# Patient Record
Sex: FEMALE | Race: WHITE | NOT HISPANIC OR LATINO | Employment: FULL TIME | URBAN - METROPOLITAN AREA
[De-identification: names, ages, dates, MRNs, and addresses within clinical notes are randomized per-mention and may not be internally consistent; named-entity substitution may affect disease eponyms.]

---

## 2023-02-25 PROBLEM — R01.1 HEART MURMUR: Status: ACTIVE | Noted: 2023-02-25

## 2023-02-25 PROBLEM — E66.01 MORBID OBESITY (MULTI): Status: ACTIVE | Noted: 2023-02-25

## 2023-02-25 PROBLEM — R63.5 WEIGHT GAIN: Status: ACTIVE | Noted: 2023-02-25

## 2023-02-25 PROBLEM — R51.9 CHRONIC HEADACHES: Status: ACTIVE | Noted: 2023-02-25

## 2023-02-25 PROBLEM — I10 HTN (HYPERTENSION): Status: ACTIVE | Noted: 2023-02-25

## 2023-02-25 PROBLEM — E05.00 GRAVES DISEASE: Status: ACTIVE | Noted: 2023-02-25

## 2023-02-25 PROBLEM — G43.909 MIGRAINES: Status: ACTIVE | Noted: 2023-02-25

## 2023-02-25 PROBLEM — L73.2 HIDRADENITIS SUPPURATIVA: Status: ACTIVE | Noted: 2023-02-25

## 2023-02-25 PROBLEM — E05.90 HYPERTHYROIDISM: Status: ACTIVE | Noted: 2023-02-25

## 2023-02-25 PROBLEM — G89.29 CHRONIC HEADACHES: Status: ACTIVE | Noted: 2023-02-25

## 2023-02-25 PROBLEM — S83.249A MEDIAL MENISCUS TEAR: Status: ACTIVE | Noted: 2023-02-25

## 2023-02-25 PROBLEM — M17.12 ARTHRITIS OF LEFT KNEE: Status: ACTIVE | Noted: 2023-02-25

## 2023-02-25 PROBLEM — E66.9 OBESITY: Status: ACTIVE | Noted: 2023-02-25

## 2023-02-25 PROBLEM — M25.562 LEFT KNEE PAIN: Status: ACTIVE | Noted: 2023-02-25

## 2023-02-25 PROBLEM — M22.42 CHONDROMALACIA OF LEFT PATELLA: Status: ACTIVE | Noted: 2023-02-25

## 2023-02-25 PROBLEM — M22.2X2 PATELLOFEMORAL PAIN SYNDROME OF LEFT KNEE: Status: ACTIVE | Noted: 2023-02-25

## 2023-02-25 PROBLEM — D53.9 ANEMIA, DEFICIENCY: Status: ACTIVE | Noted: 2023-02-25

## 2023-02-25 PROBLEM — M17.9 OSTEOARTHRITIS OF KNEE, UNSPECIFIED: Status: ACTIVE | Noted: 2023-02-25

## 2023-02-25 RX ORDER — LISINOPRIL AND HYDROCHLOROTHIAZIDE 10; 12.5 MG/1; MG/1
1 TABLET ORAL DAILY
COMMUNITY
Start: 2022-04-08 | End: 2024-04-16

## 2023-02-25 RX ORDER — VIT C/E/ZN/COPPR/LUTEIN/ZEAXAN 250MG-90MG
1 CAPSULE ORAL DAILY
COMMUNITY
Start: 2019-10-24

## 2023-02-25 RX ORDER — GALCANEZUMAB 120 MG/ML
INJECTION, SOLUTION SUBCUTANEOUS
COMMUNITY
Start: 2022-03-16 | End: 2023-03-14

## 2023-02-25 RX ORDER — MULTIVIT WITH MINERALS/HERBS
1 TABLET ORAL DAILY
COMMUNITY
Start: 2019-10-24

## 2023-02-25 RX ORDER — METOPROLOL SUCCINATE 50 MG/1
1 TABLET, EXTENDED RELEASE ORAL DAILY
COMMUNITY
Start: 2019-10-24 | End: 2023-04-25

## 2023-02-25 RX ORDER — TOPIRAMATE 25 MG/1
1-2 TABLET ORAL NIGHTLY
COMMUNITY
Start: 2014-12-30 | End: 2023-03-14 | Stop reason: SDUPTHER

## 2023-02-25 RX ORDER — AMLODIPINE BESYLATE 5 MG/1
1 TABLET ORAL DAILY
COMMUNITY
Start: 2015-01-10 | End: 2023-04-25

## 2023-03-06 ENCOUNTER — APPOINTMENT (OUTPATIENT)
Dept: PRIMARY CARE | Facility: CLINIC | Age: 43
End: 2023-03-06
Payer: COMMERCIAL

## 2023-03-14 ENCOUNTER — OFFICE VISIT (OUTPATIENT)
Dept: PRIMARY CARE | Facility: CLINIC | Age: 43
End: 2023-03-14
Payer: COMMERCIAL

## 2023-03-14 VITALS
BODY MASS INDEX: 54.18 KG/M2 | DIASTOLIC BLOOD PRESSURE: 76 MMHG | WEIGHT: 293 LBS | TEMPERATURE: 98 F | SYSTOLIC BLOOD PRESSURE: 126 MMHG | HEART RATE: 94 BPM | OXYGEN SATURATION: 98 %

## 2023-03-14 DIAGNOSIS — E66.01 MORBID OBESITY (MULTI): Primary | ICD-10-CM

## 2023-03-14 DIAGNOSIS — G43.009 MIGRAINE WITHOUT AURA AND WITHOUT STATUS MIGRAINOSUS, NOT INTRACTABLE: ICD-10-CM

## 2023-03-14 PROCEDURE — 1036F TOBACCO NON-USER: CPT | Performed by: FAMILY MEDICINE

## 2023-03-14 PROCEDURE — 3074F SYST BP LT 130 MM HG: CPT | Performed by: FAMILY MEDICINE

## 2023-03-14 PROCEDURE — 3078F DIAST BP <80 MM HG: CPT | Performed by: FAMILY MEDICINE

## 2023-03-14 PROCEDURE — 99214 OFFICE O/P EST MOD 30 MIN: CPT | Performed by: FAMILY MEDICINE

## 2023-03-14 RX ORDER — NABUMETONE 750 MG/1
750-1500 TABLET, FILM COATED ORAL DAILY
COMMUNITY
Start: 2022-10-19 | End: 2023-03-14 | Stop reason: SDUPTHER

## 2023-03-14 RX ORDER — TOPIRAMATE 50 MG/1
100 TABLET, FILM COATED ORAL NIGHTLY
Qty: 60 TABLET | Refills: 11 | Status: SHIPPED | OUTPATIENT
Start: 2023-03-14 | End: 2024-03-13 | Stop reason: WASHOUT

## 2023-03-14 RX ORDER — NABUMETONE 750 MG/1
750-1500 TABLET, FILM COATED ORAL DAILY
Qty: 180 TABLET | Refills: 3 | Status: SHIPPED | OUTPATIENT
Start: 2023-03-14 | End: 2024-03-13 | Stop reason: WASHOUT

## 2023-03-14 NOTE — PROGRESS NOTES
Subjective   Patient ID: Yudy Chou is a 42 y.o. female who presents for Follow-up (Review medication and FMLA forms. ) and Thyroid Problem (Weight weight; thyroid concerns ).    HPI patient here with complaint of weight gain.  She states she is eating much healthier and yet she still gains weight.  She admits she has a very inactive job and does work long shifts of up to 12 to 14 hours.  She is too tired to exercise.  Patient has been using her Nurtec for the headaches.  This works well.  Unfortunately she is still having a fair amount of headaches regularly.  Patient does admit her sleep is poor.  She denies any signs of apnea.  Her partner has not reported any concerns.  Review of Systems    Objective   /76 (BP Location: Left arm, Patient Position: Sitting, BP Cuff Size: Adult)   Pulse 94   Temp 36.7 °C (98 °F) (Temporal)   Wt (!) 148 kg (325 lb 9.6 oz)   LMP 02/20/2023   SpO2 98%   BMI 54.18 kg/m²     Physical Exam  Alert, pleasant and in no acute distress.  Heart: Regular rate and rhythm without murmur  Lungs: Clear to auscultation  Assessment/Plan   Problem List Items Addressed This Visit          Endocrine/Metabolic    Morbid obesity (CMS/HCC) - Primary    Relevant Orders    CBC    TSH    T4, free       Other    Migraines    Relevant Medications    topiramate (Topamax) 50 mg tablet    nabumetone (Relafen) 750 mg tablet   Patient here with complaint of weight gain.  Long talk with patient regarding diet.  She is generally eating a healthy diet.  Advised patient to work on a lower calorie diet.  Advised her to think about someone who has  gastric bypass surgery.  We will have her go on a very low diet and slowly taper up to 1200 mg and stay there.  We will have her follow-up in 2 to 3 months.  I did recheck her CBC and her thyroid due to her ongoing struggles with weight and fatigue.  For her headaches, we will increase her topiramate.  She is to continue using the Nurtec as needed as this has  worked very well.  We will call in 3 to 5 days with lab results.

## 2023-04-21 DIAGNOSIS — I10 ESSENTIAL (PRIMARY) HYPERTENSION: ICD-10-CM

## 2023-04-25 RX ORDER — AMLODIPINE BESYLATE 5 MG/1
TABLET ORAL
Qty: 90 TABLET | Refills: 3 | Status: SHIPPED | OUTPATIENT
Start: 2023-04-25 | End: 2024-04-16

## 2023-04-25 RX ORDER — METOPROLOL SUCCINATE 50 MG/1
TABLET, EXTENDED RELEASE ORAL
Qty: 90 TABLET | Refills: 3 | Status: SHIPPED | OUTPATIENT
Start: 2023-04-25

## 2023-06-23 DIAGNOSIS — G43.009 MIGRAINE WITHOUT AURA AND WITHOUT STATUS MIGRAINOSUS, NOT INTRACTABLE: Primary | ICD-10-CM

## 2023-07-18 ENCOUNTER — TELEPHONE (OUTPATIENT)
Dept: PRIMARY CARE | Facility: CLINIC | Age: 43
End: 2023-07-18
Payer: COMMERCIAL

## 2023-07-18 DIAGNOSIS — T75.3XXA MOTION SICKNESS, INITIAL ENCOUNTER: Primary | ICD-10-CM

## 2023-07-18 RX ORDER — SCOLOPAMINE TRANSDERMAL SYSTEM 1 MG/1
1 PATCH, EXTENDED RELEASE TRANSDERMAL
Qty: 4 PATCH | Refills: 2 | Status: SHIPPED | OUTPATIENT
Start: 2023-07-18 | End: 2023-09-16

## 2023-07-18 NOTE — TELEPHONE ENCOUNTER
Pt called and states that she leaving for a cruise on Thursday and is wondering if you could prescribe her something for sea sickness. Pharmacy is on file and no allergies to any medications.

## 2023-10-11 ENCOUNTER — TELEPHONE (OUTPATIENT)
Dept: PRIMARY CARE | Facility: CLINIC | Age: 43
End: 2023-10-11
Payer: COMMERCIAL

## 2023-10-11 NOTE — TELEPHONE ENCOUNTER
Pt called and scheduled annual for 11/13 @ 3:30. Pt would like to know if you can place the blood work orders in prior to appt. Ty

## 2023-10-13 DIAGNOSIS — E05.90 HYPERTHYROIDISM: ICD-10-CM

## 2023-10-13 DIAGNOSIS — I10 PRIMARY HYPERTENSION: Primary | ICD-10-CM

## 2023-10-13 DIAGNOSIS — Z00.00 ANNUAL PHYSICAL EXAM: ICD-10-CM

## 2023-10-13 DIAGNOSIS — E66.01 CLASS 3 SEVERE OBESITY WITH SERIOUS COMORBIDITY AND BODY MASS INDEX (BMI) OF 40.0 TO 44.9 IN ADULT, UNSPECIFIED OBESITY TYPE (MULTI): ICD-10-CM

## 2023-10-24 ENCOUNTER — APPOINTMENT (OUTPATIENT)
Dept: PRIMARY CARE | Facility: CLINIC | Age: 43
End: 2023-10-24
Payer: COMMERCIAL

## 2023-11-11 ENCOUNTER — LAB (OUTPATIENT)
Dept: LAB | Facility: LAB | Age: 43
End: 2023-11-11
Payer: COMMERCIAL

## 2023-11-11 DIAGNOSIS — Z00.00 ANNUAL PHYSICAL EXAM: ICD-10-CM

## 2023-11-11 DIAGNOSIS — I10 PRIMARY HYPERTENSION: ICD-10-CM

## 2023-11-11 DIAGNOSIS — E66.01 CLASS 3 SEVERE OBESITY WITH SERIOUS COMORBIDITY AND BODY MASS INDEX (BMI) OF 40.0 TO 44.9 IN ADULT, UNSPECIFIED OBESITY TYPE (MULTI): ICD-10-CM

## 2023-11-11 DIAGNOSIS — E05.90 HYPERTHYROIDISM: ICD-10-CM

## 2023-11-11 LAB
ALBUMIN SERPL BCP-MCNC: 4.1 G/DL (ref 3.4–5)
ALP SERPL-CCNC: 68 U/L (ref 33–110)
ALT SERPL W P-5'-P-CCNC: 27 U/L (ref 7–45)
ANION GAP SERPL CALC-SCNC: 11 MMOL/L (ref 10–20)
AST SERPL W P-5'-P-CCNC: 16 U/L (ref 9–39)
BILIRUB SERPL-MCNC: 0.3 MG/DL (ref 0–1.2)
BUN SERPL-MCNC: 16 MG/DL (ref 6–23)
CALCIUM SERPL-MCNC: 8.7 MG/DL (ref 8.6–10.3)
CHLORIDE SERPL-SCNC: 104 MMOL/L (ref 98–107)
CHOLEST SERPL-MCNC: 190 MG/DL (ref 0–199)
CHOLESTEROL/HDL RATIO: 3.6
CO2 SERPL-SCNC: 29 MMOL/L (ref 21–32)
CREAT SERPL-MCNC: 0.77 MG/DL (ref 0.5–1.05)
ERYTHROCYTE [DISTWIDTH] IN BLOOD BY AUTOMATED COUNT: 14.1 % (ref 11.5–14.5)
EST. AVERAGE GLUCOSE BLD GHB EST-MCNC: 114 MG/DL
GFR SERPL CREATININE-BSD FRML MDRD: >90 ML/MIN/1.73M*2
GLUCOSE SERPL-MCNC: 93 MG/DL (ref 74–99)
HBA1C MFR BLD: 5.6 %
HCT VFR BLD AUTO: 36.6 % (ref 36–46)
HDLC SERPL-MCNC: 52.9 MG/DL
HGB BLD-MCNC: 11.6 G/DL (ref 12–16)
LDLC SERPL CALC-MCNC: 107 MG/DL
MCH RBC QN AUTO: 28.8 PG (ref 26–34)
MCHC RBC AUTO-ENTMCNC: 31.7 G/DL (ref 32–36)
MCV RBC AUTO: 91 FL (ref 80–100)
NON HDL CHOLESTEROL: 137 MG/DL (ref 0–149)
NRBC BLD-RTO: 0 /100 WBCS (ref 0–0)
PLATELET # BLD AUTO: 348 X10*3/UL (ref 150–450)
POTASSIUM SERPL-SCNC: 4.5 MMOL/L (ref 3.5–5.3)
PROT SERPL-MCNC: 7.3 G/DL (ref 6.4–8.2)
RBC # BLD AUTO: 4.03 X10*6/UL (ref 4–5.2)
SODIUM SERPL-SCNC: 139 MMOL/L (ref 136–145)
T4 FREE SERPL-MCNC: 0.44 NG/DL (ref 0.61–1.12)
TRIGL SERPL-MCNC: 150 MG/DL (ref 0–149)
TSH SERPL-ACNC: 22.5 MIU/L (ref 0.44–3.98)
VLDL: 30 MG/DL (ref 0–40)
WBC # BLD AUTO: 9.4 X10*3/UL (ref 4.4–11.3)

## 2023-11-11 PROCEDURE — 80061 LIPID PANEL: CPT

## 2023-11-11 PROCEDURE — 83036 HEMOGLOBIN GLYCOSYLATED A1C: CPT

## 2023-11-11 PROCEDURE — 80053 COMPREHEN METABOLIC PANEL: CPT

## 2023-11-11 PROCEDURE — 84443 ASSAY THYROID STIM HORMONE: CPT

## 2023-11-11 PROCEDURE — 36415 COLL VENOUS BLD VENIPUNCTURE: CPT

## 2023-11-11 PROCEDURE — 84439 ASSAY OF FREE THYROXINE: CPT

## 2023-11-11 PROCEDURE — 85027 COMPLETE CBC AUTOMATED: CPT

## 2023-11-13 ENCOUNTER — OFFICE VISIT (OUTPATIENT)
Dept: PRIMARY CARE | Facility: CLINIC | Age: 43
End: 2023-11-13
Payer: COMMERCIAL

## 2023-11-13 VITALS — WEIGHT: 293 LBS | DIASTOLIC BLOOD PRESSURE: 74 MMHG | SYSTOLIC BLOOD PRESSURE: 126 MMHG | BODY MASS INDEX: 55.11 KG/M2

## 2023-11-13 DIAGNOSIS — Z12.39 ENCOUNTER FOR SCREENING FOR MALIGNANT NEOPLASM OF BREAST, UNSPECIFIED SCREENING MODALITY: ICD-10-CM

## 2023-11-13 DIAGNOSIS — M79.672 PAIN IN BOTH FEET: ICD-10-CM

## 2023-11-13 DIAGNOSIS — G43.009 MIGRAINE WITHOUT AURA AND WITHOUT STATUS MIGRAINOSUS, NOT INTRACTABLE: ICD-10-CM

## 2023-11-13 DIAGNOSIS — Z00.00 ANNUAL PHYSICAL EXAM: Primary | ICD-10-CM

## 2023-11-13 DIAGNOSIS — M79.671 PAIN IN BOTH FEET: ICD-10-CM

## 2023-11-13 DIAGNOSIS — E03.9 HYPOTHYROIDISM, UNSPECIFIED TYPE: ICD-10-CM

## 2023-11-13 PROCEDURE — 1036F TOBACCO NON-USER: CPT | Performed by: FAMILY MEDICINE

## 2023-11-13 PROCEDURE — 90471 IMMUNIZATION ADMIN: CPT | Performed by: FAMILY MEDICINE

## 2023-11-13 PROCEDURE — 99396 PREV VISIT EST AGE 40-64: CPT | Performed by: FAMILY MEDICINE

## 2023-11-13 PROCEDURE — 3074F SYST BP LT 130 MM HG: CPT | Performed by: FAMILY MEDICINE

## 2023-11-13 PROCEDURE — 90686 IIV4 VACC NO PRSV 0.5 ML IM: CPT | Performed by: FAMILY MEDICINE

## 2023-11-13 PROCEDURE — 3078F DIAST BP <80 MM HG: CPT | Performed by: FAMILY MEDICINE

## 2023-11-13 RX ORDER — LEVOTHYROXINE SODIUM 50 UG/1
50 TABLET ORAL DAILY
Qty: 30 TABLET | Refills: 11 | Status: SHIPPED | OUTPATIENT
Start: 2023-11-13 | End: 2024-03-13 | Stop reason: SDUPTHER

## 2023-11-13 RX ORDER — LEVOTHYROXINE SODIUM 50 UG/1
50 TABLET ORAL DAILY
Qty: 30 TABLET | Refills: 11 | Status: SHIPPED | OUTPATIENT
Start: 2023-11-13 | End: 2023-11-13 | Stop reason: SDUPTHER

## 2023-11-13 ASSESSMENT — PAIN SCALES - GENERAL: PAINLEVEL: 0-NO PAIN

## 2023-11-13 ASSESSMENT — ENCOUNTER SYMPTOMS: DEPRESSION: 0

## 2023-11-13 NOTE — PROGRESS NOTES
Subjective   Patient ID: Yudy Chou is a 43 y.o. female who presents for Annual Exam (Annual physical exam,pt is not fasting. ).    HPI patient here for annual checkup.  Her main complaint is her weight.  She is working long hours, anywhere from 10 to 14 hours a day.  She is not able to be very physically active.  She sits all day at work.    Review of Systems    Objective   /74 (BP Location: Left arm, Patient Position: Sitting, BP Cuff Size: Adult)   Wt (!) 150 kg (331 lb 3.2 oz)   BMI 55.11 kg/m²     Physical Exam  Alert, pleasant and in no acute distress.  Neck: Supple, no JVD or carotid bruit  Heart: Regular rate and rhythm, no murmur  Lungs: Clear to auscultation  Lower extremities: No edema  Abd: nontender,no mass    Assessment/Plan   Problem List Items Addressed This Visit             ICD-10-CM       Neuro    Migraines G43.909    Relevant Medications    rimegepant (Nurtec ODT) 75 mg tablet,disintegrating     Other Visit Diagnoses         Codes    Annual physical exam    -  Primary Z00.00    Hypothyroidism, unspecified type     E03.9    Relevant Medications    levothyroxine (Synthroid, Levoxyl) 50 mcg tablet    Pain in both feet     M79.671, M79.672    Relevant Orders    Referral to Podiatry    Encounter for screening for malignant neoplasm of breast, unspecified screening modality     Z12.39    Relevant Orders    BI mammo bilateral screening tomosynthesis        1.  Annual: Routine labs were completed and results reviewed.  Flu vaccine provided.  Morbid obesity: Discussed various treatment options.  We will see if her insurance will cover Mounjaro.  If not, she will discuss with her insurance what options she has.  Follow-up 3 months for reevaluation and Pap test.  Migraines: Under good control with topiramate and Nurtec.  Nurtec has been the only thing that truly has given her good relief.  Past treatments have not worked well at all.  Patient had side effects from the triptans.  CGRP inhibitor  did not work.  Patient with cracking skin on her feet.  She also has discomfort.  We will have her see podiatry

## 2024-01-17 ENCOUNTER — APPOINTMENT (OUTPATIENT)
Dept: RADIOLOGY | Facility: CLINIC | Age: 44
End: 2024-01-17
Payer: COMMERCIAL

## 2024-01-26 ENCOUNTER — APPOINTMENT (OUTPATIENT)
Dept: RADIOLOGY | Facility: CLINIC | Age: 44
End: 2024-01-26
Payer: COMMERCIAL

## 2024-02-08 ENCOUNTER — APPOINTMENT (OUTPATIENT)
Dept: RADIOLOGY | Facility: CLINIC | Age: 44
End: 2024-02-08
Payer: COMMERCIAL

## 2024-02-23 ENCOUNTER — APPOINTMENT (OUTPATIENT)
Dept: RADIOLOGY | Facility: CLINIC | Age: 44
End: 2024-02-23
Payer: COMMERCIAL

## 2024-02-23 ENCOUNTER — APPOINTMENT (OUTPATIENT)
Dept: PRIMARY CARE | Facility: CLINIC | Age: 44
End: 2024-02-23
Payer: COMMERCIAL

## 2024-03-12 ENCOUNTER — LAB (OUTPATIENT)
Dept: LAB | Facility: LAB | Age: 44
End: 2024-03-12
Payer: COMMERCIAL

## 2024-03-12 DIAGNOSIS — E66.01 MORBID OBESITY (MULTI): ICD-10-CM

## 2024-03-12 LAB
ERYTHROCYTE [DISTWIDTH] IN BLOOD BY AUTOMATED COUNT: 14.3 % (ref 11.5–14.5)
HCT VFR BLD AUTO: 35.7 % (ref 36–46)
HGB BLD-MCNC: 11.7 G/DL (ref 12–16)
MCH RBC QN AUTO: 28.5 PG (ref 26–34)
MCHC RBC AUTO-ENTMCNC: 32.8 G/DL (ref 32–36)
MCV RBC AUTO: 87 FL (ref 80–100)
NRBC BLD-RTO: 0 /100 WBCS (ref 0–0)
PLATELET # BLD AUTO: 408 X10*3/UL (ref 150–450)
RBC # BLD AUTO: 4.1 X10*6/UL (ref 4–5.2)
T4 FREE SERPL-MCNC: 0.67 NG/DL (ref 0.61–1.12)
TSH SERPL-ACNC: 12.82 MIU/L (ref 0.44–3.98)
WBC # BLD AUTO: 11.7 X10*3/UL (ref 4.4–11.3)

## 2024-03-12 PROCEDURE — 84443 ASSAY THYROID STIM HORMONE: CPT

## 2024-03-12 PROCEDURE — 84439 ASSAY OF FREE THYROXINE: CPT

## 2024-03-12 PROCEDURE — 85027 COMPLETE CBC AUTOMATED: CPT

## 2024-03-12 PROCEDURE — 36415 COLL VENOUS BLD VENIPUNCTURE: CPT

## 2024-03-13 ENCOUNTER — APPOINTMENT (OUTPATIENT)
Dept: RADIOLOGY | Facility: CLINIC | Age: 44
End: 2024-03-13
Payer: COMMERCIAL

## 2024-03-13 ENCOUNTER — OFFICE VISIT (OUTPATIENT)
Dept: PRIMARY CARE | Facility: CLINIC | Age: 44
End: 2024-03-13
Payer: COMMERCIAL

## 2024-03-13 VITALS
BODY MASS INDEX: 53.65 KG/M2 | TEMPERATURE: 96.9 F | OXYGEN SATURATION: 97 % | HEART RATE: 102 BPM | WEIGHT: 293 LBS | SYSTOLIC BLOOD PRESSURE: 126 MMHG | DIASTOLIC BLOOD PRESSURE: 70 MMHG

## 2024-03-13 DIAGNOSIS — G43.909 MIGRAINE WITHOUT STATUS MIGRAINOSUS, NOT INTRACTABLE, UNSPECIFIED MIGRAINE TYPE: ICD-10-CM

## 2024-03-13 DIAGNOSIS — E03.9 HYPOTHYROIDISM, UNSPECIFIED TYPE: ICD-10-CM

## 2024-03-13 DIAGNOSIS — R51.9 CHRONIC NONINTRACTABLE HEADACHE, UNSPECIFIED HEADACHE TYPE: ICD-10-CM

## 2024-03-13 DIAGNOSIS — G89.29 CHRONIC NONINTRACTABLE HEADACHE, UNSPECIFIED HEADACHE TYPE: ICD-10-CM

## 2024-03-13 DIAGNOSIS — T75.3XXA MOTION SICKNESS, INITIAL ENCOUNTER: Primary | ICD-10-CM

## 2024-03-13 PROCEDURE — 3078F DIAST BP <80 MM HG: CPT | Performed by: FAMILY MEDICINE

## 2024-03-13 PROCEDURE — 1036F TOBACCO NON-USER: CPT | Performed by: FAMILY MEDICINE

## 2024-03-13 PROCEDURE — 99214 OFFICE O/P EST MOD 30 MIN: CPT | Performed by: FAMILY MEDICINE

## 2024-03-13 PROCEDURE — 3074F SYST BP LT 130 MM HG: CPT | Performed by: FAMILY MEDICINE

## 2024-03-13 RX ORDER — LEVOTHYROXINE SODIUM 75 UG/1
75 TABLET ORAL DAILY
Qty: 30 TABLET | Refills: 11 | Status: SHIPPED | OUTPATIENT
Start: 2024-03-13 | End: 2025-03-13

## 2024-03-13 RX ORDER — SCOLOPAMINE TRANSDERMAL SYSTEM 1 MG/1
1 PATCH, EXTENDED RELEASE TRANSDERMAL
Qty: 4 PATCH | Refills: 0 | Status: SHIPPED | OUTPATIENT
Start: 2024-03-13 | End: 2024-05-12

## 2024-03-13 ASSESSMENT — PAIN SCALES - GENERAL: PAINLEVEL: 0-NO PAIN

## 2024-03-13 ASSESSMENT — ENCOUNTER SYMPTOMS: DEPRESSION: 0

## 2024-03-13 NOTE — PROGRESS NOTES
Subjective   Patient ID: Yudy Chou is a 43 y.o. female who presents for Follow-up (Pt is here for FMLA and thyroid check. ).    HPI   Tired despite thyroid replacement.  This weeks levels show an elevated TSH with a low normal T4.  Migraines doing pretty good.  On night shift.    Review of Systems    Objective   /70 (BP Location: Left arm, Patient Position: Sitting, BP Cuff Size: Adult)   Pulse 102   Temp 36.1 °C (96.9 °F) (Temporal)   Wt 146 kg (322 lb 6.4 oz)   SpO2 97%   BMI 53.65 kg/m²     Physical Exam  Alert, pleasant and in no acute distress.  Heart: Regular rate and rhythm without murmur  Lungs: Clear to auscultation  Lower extremities: No edema    Assessment/Plan   Problem List Items Addressed This Visit             ICD-10-CM    Chronic headaches R51.9, G89.29    Migraines G43.909     Other Visit Diagnoses         Codes    Motion sickness, initial encounter    -  Primary T75.3XXA    Relevant Medications    scopolamine (Transderm-Scop) 1 mg over 3 days patch 3 day    Hypothyroidism, unspecified type     E03.9    Relevant Medications    levothyroxine (Synthroid, Levoxyl) 75 mcg tablet    Other Relevant Orders    TSH with reflex to Free T4 if abnormal        1.  Hypothyroidism: With high TSH and low normal T4, we will increase levothyroxine.  She is to get her next TSH after 2 months.  2.  Migraines/chronic headaches: Doing well on Nurtec  3.  Motion sickness: Patient going on a cruise and would like treatment for motion sickness.  She has done well with Transderm scopolamine patches.  Prescription provided.  Follow-up in 3 to 4 months

## 2024-03-27 ENCOUNTER — APPOINTMENT (OUTPATIENT)
Dept: RADIOLOGY | Facility: CLINIC | Age: 44
End: 2024-03-27
Payer: COMMERCIAL

## 2024-04-05 ENCOUNTER — APPOINTMENT (OUTPATIENT)
Dept: RADIOLOGY | Facility: CLINIC | Age: 44
End: 2024-04-05
Payer: COMMERCIAL

## 2024-04-16 DIAGNOSIS — I10 ESSENTIAL (PRIMARY) HYPERTENSION: ICD-10-CM

## 2024-04-16 RX ORDER — LISINOPRIL AND HYDROCHLOROTHIAZIDE 10; 12.5 MG/1; MG/1
1 TABLET ORAL DAILY
Qty: 90 TABLET | Refills: 3 | Status: SHIPPED | OUTPATIENT
Start: 2024-04-16

## 2024-04-16 RX ORDER — AMLODIPINE BESYLATE 5 MG/1
TABLET ORAL
Qty: 90 TABLET | Refills: 3 | Status: SHIPPED | OUTPATIENT
Start: 2024-04-16

## 2024-04-24 ENCOUNTER — APPOINTMENT (OUTPATIENT)
Dept: RADIOLOGY | Facility: CLINIC | Age: 44
End: 2024-04-24
Payer: COMMERCIAL

## 2024-05-08 ENCOUNTER — APPOINTMENT (OUTPATIENT)
Dept: RADIOLOGY | Facility: CLINIC | Age: 44
End: 2024-05-08
Payer: COMMERCIAL

## 2024-05-14 ENCOUNTER — APPOINTMENT (OUTPATIENT)
Dept: RADIOLOGY | Facility: CLINIC | Age: 44
End: 2024-05-14
Payer: COMMERCIAL

## 2024-05-23 ENCOUNTER — APPOINTMENT (OUTPATIENT)
Dept: RADIOLOGY | Facility: CLINIC | Age: 44
End: 2024-05-23
Payer: COMMERCIAL

## 2024-06-05 ENCOUNTER — APPOINTMENT (OUTPATIENT)
Dept: RADIOLOGY | Facility: CLINIC | Age: 44
End: 2024-06-05
Payer: COMMERCIAL

## 2024-06-20 ENCOUNTER — APPOINTMENT (OUTPATIENT)
Dept: RADIOLOGY | Facility: CLINIC | Age: 44
End: 2024-06-20
Payer: COMMERCIAL

## 2024-06-30 DIAGNOSIS — I10 ESSENTIAL (PRIMARY) HYPERTENSION: ICD-10-CM

## 2024-07-01 PROBLEM — J20.9 ACUTE BRONCHITIS: Status: ACTIVE | Noted: 2024-07-01

## 2024-07-01 PROBLEM — K62.5 RECTAL HEMORRHAGE: Status: ACTIVE | Noted: 2024-07-01

## 2024-07-01 PROBLEM — L72.3 SEBACEOUS CYST: Status: ACTIVE | Noted: 2024-07-01

## 2024-07-01 PROBLEM — M22.2X9 PATELLOFEMORAL PAIN SYNDROME: Status: ACTIVE | Noted: 2024-07-01

## 2024-07-01 PROBLEM — B37.31 CANDIDIASIS OF VAGINA: Status: ACTIVE | Noted: 2024-07-01

## 2024-07-01 PROBLEM — L70.9 ACNE: Status: ACTIVE | Noted: 2024-07-01

## 2024-07-01 PROBLEM — K29.00 ACUTE GASTRITIS: Status: ACTIVE | Noted: 2024-07-01

## 2024-07-01 PROBLEM — R05.3 PERSISTENT COUGH: Status: ACTIVE | Noted: 2024-07-01

## 2024-07-01 PROBLEM — R00.0 TACHYCARDIA: Status: ACTIVE | Noted: 2024-07-01

## 2024-07-01 PROBLEM — R10.13 EPIGASTRIC PAIN: Status: ACTIVE | Noted: 2024-07-01

## 2024-07-01 RX ORDER — METOPROLOL SUCCINATE 50 MG/1
TABLET, EXTENDED RELEASE ORAL
Qty: 90 TABLET | Refills: 1 | Status: SHIPPED | OUTPATIENT
Start: 2024-07-01

## 2024-07-01 RX ORDER — TERBINAFINE HYDROCHLORIDE 250 MG/1
1 TABLET ORAL
COMMUNITY
Start: 2024-02-28

## 2024-07-12 ENCOUNTER — APPOINTMENT (OUTPATIENT)
Dept: RADIOLOGY | Facility: CLINIC | Age: 44
End: 2024-07-12
Payer: COMMERCIAL

## 2024-07-22 DIAGNOSIS — G43.009 MIGRAINE WITHOUT AURA AND WITHOUT STATUS MIGRAINOSUS, NOT INTRACTABLE: ICD-10-CM

## 2024-09-24 ENCOUNTER — TELEPHONE (OUTPATIENT)
Dept: PRIMARY CARE | Facility: CLINIC | Age: 44
End: 2024-09-24
Payer: COMMERCIAL

## 2024-09-24 NOTE — TELEPHONE ENCOUNTER
Pt was here in march but needed [facility] paperwork done.  Do not see anything  Until end of October.  Please advise  Ty

## 2024-10-10 ENCOUNTER — APPOINTMENT (OUTPATIENT)
Dept: PRIMARY CARE | Facility: CLINIC | Age: 44
End: 2024-10-10
Payer: COMMERCIAL

## 2024-10-22 ENCOUNTER — APPOINTMENT (OUTPATIENT)
Dept: PRIMARY CARE | Facility: CLINIC | Age: 44
End: 2024-10-22
Payer: COMMERCIAL

## 2024-10-23 ENCOUNTER — HOSPITAL ENCOUNTER (EMERGENCY)
Facility: HOSPITAL | Age: 44
Discharge: OTHER INSTITUTION | End: 2024-10-23
Payer: COMMERCIAL

## 2024-10-23 ENCOUNTER — APPOINTMENT (OUTPATIENT)
Dept: CARDIOLOGY | Facility: HOSPITAL | Age: 44
End: 2024-10-23
Payer: COMMERCIAL

## 2024-10-23 ENCOUNTER — APPOINTMENT (OUTPATIENT)
Dept: RADIOLOGY | Facility: HOSPITAL | Age: 44
End: 2024-10-23
Payer: COMMERCIAL

## 2024-10-23 ENCOUNTER — HOSPITAL ENCOUNTER (EMERGENCY)
Facility: HOSPITAL | Age: 44
Discharge: HOME | End: 2024-10-23
Attending: STUDENT IN AN ORGANIZED HEALTH CARE EDUCATION/TRAINING PROGRAM
Payer: COMMERCIAL

## 2024-10-23 VITALS
RESPIRATION RATE: 20 BRPM | TEMPERATURE: 96.8 F | BODY MASS INDEX: 51.91 KG/M2 | OXYGEN SATURATION: 98 % | HEART RATE: 86 BPM | SYSTOLIC BLOOD PRESSURE: 189 MMHG | DIASTOLIC BLOOD PRESSURE: 105 MMHG | WEIGHT: 293 LBS | HEIGHT: 63 IN

## 2024-10-23 DIAGNOSIS — R07.9 CHEST PAIN, UNSPECIFIED TYPE: Primary | ICD-10-CM

## 2024-10-23 LAB
ALBUMIN SERPL BCP-MCNC: 4.2 G/DL (ref 3.4–5)
ALP SERPL-CCNC: 75 U/L (ref 33–110)
ALT SERPL W P-5'-P-CCNC: 50 U/L (ref 7–45)
ANION GAP SERPL CALC-SCNC: 13 MMOL/L (ref 10–20)
AST SERPL W P-5'-P-CCNC: 39 U/L (ref 9–39)
BASOPHILS # BLD AUTO: 0.05 X10*3/UL (ref 0–0.1)
BASOPHILS NFR BLD AUTO: 0.6 %
BILIRUB SERPL-MCNC: 0.4 MG/DL (ref 0–1.2)
BNP SERPL-MCNC: 4 PG/ML (ref 0–99)
BUN SERPL-MCNC: 10 MG/DL (ref 6–23)
CALCIUM SERPL-MCNC: 8.8 MG/DL (ref 8.6–10.3)
CARDIAC TROPONIN I PNL SERPL HS: 3 NG/L (ref 0–13)
CARDIAC TROPONIN I PNL SERPL HS: <3 NG/L (ref 0–13)
CHLORIDE SERPL-SCNC: 103 MMOL/L (ref 98–107)
CO2 SERPL-SCNC: 23 MMOL/L (ref 21–32)
CREAT SERPL-MCNC: 0.75 MG/DL (ref 0.5–1.05)
EGFRCR SERPLBLD CKD-EPI 2021: >90 ML/MIN/1.73M*2
EOSINOPHIL # BLD AUTO: 0.13 X10*3/UL (ref 0–0.7)
EOSINOPHIL NFR BLD AUTO: 1.6 %
ERYTHROCYTE [DISTWIDTH] IN BLOOD BY AUTOMATED COUNT: 14.1 % (ref 11.5–14.5)
GLUCOSE SERPL-MCNC: 104 MG/DL (ref 74–99)
HCT VFR BLD AUTO: 40.1 % (ref 36–46)
HGB BLD-MCNC: 13 G/DL (ref 12–16)
IMM GRANULOCYTES # BLD AUTO: 0.07 X10*3/UL (ref 0–0.7)
IMM GRANULOCYTES NFR BLD AUTO: 0.9 % (ref 0–0.9)
INR PPP: 1 (ref 0.9–1.1)
LYMPHOCYTES # BLD AUTO: 2.66 X10*3/UL (ref 1.2–4.8)
LYMPHOCYTES NFR BLD AUTO: 33 %
MCH RBC QN AUTO: 27 PG (ref 26–34)
MCHC RBC AUTO-ENTMCNC: 32.4 G/DL (ref 32–36)
MCV RBC AUTO: 83 FL (ref 80–100)
MONOCYTES # BLD AUTO: 0.8 X10*3/UL (ref 0.1–1)
MONOCYTES NFR BLD AUTO: 9.9 %
NEUTROPHILS # BLD AUTO: 4.34 X10*3/UL (ref 1.2–7.7)
NEUTROPHILS NFR BLD AUTO: 54 %
NRBC BLD-RTO: 0 /100 WBCS (ref 0–0)
PLATELET # BLD AUTO: 334 X10*3/UL (ref 150–450)
POTASSIUM SERPL-SCNC: 3.4 MMOL/L (ref 3.5–5.3)
PROT SERPL-MCNC: 7.6 G/DL (ref 6.4–8.2)
PROTHROMBIN TIME: 11.8 SECONDS (ref 9.8–12.8)
RBC # BLD AUTO: 4.81 X10*6/UL (ref 4–5.2)
SODIUM SERPL-SCNC: 136 MMOL/L (ref 136–145)
WBC # BLD AUTO: 8.1 X10*3/UL (ref 4.4–11.3)

## 2024-10-23 PROCEDURE — 83880 ASSAY OF NATRIURETIC PEPTIDE: CPT | Performed by: EMERGENCY MEDICINE

## 2024-10-23 PROCEDURE — 93005 ELECTROCARDIOGRAM TRACING: CPT

## 2024-10-23 PROCEDURE — 71045 X-RAY EXAM CHEST 1 VIEW: CPT | Mod: FOREIGN READ | Performed by: RADIOLOGY

## 2024-10-23 PROCEDURE — 85025 COMPLETE CBC W/AUTO DIFF WBC: CPT | Performed by: EMERGENCY MEDICINE

## 2024-10-23 PROCEDURE — 4500999001 HC ED NO CHARGE

## 2024-10-23 PROCEDURE — 99283 EMERGENCY DEPT VISIT LOW MDM: CPT | Mod: 25 | Performed by: STUDENT IN AN ORGANIZED HEALTH CARE EDUCATION/TRAINING PROGRAM

## 2024-10-23 PROCEDURE — 85610 PROTHROMBIN TIME: CPT | Performed by: EMERGENCY MEDICINE

## 2024-10-23 PROCEDURE — 80053 COMPREHEN METABOLIC PANEL: CPT | Performed by: STUDENT IN AN ORGANIZED HEALTH CARE EDUCATION/TRAINING PROGRAM

## 2024-10-23 PROCEDURE — 71045 X-RAY EXAM CHEST 1 VIEW: CPT

## 2024-10-23 PROCEDURE — 36415 COLL VENOUS BLD VENIPUNCTURE: CPT | Performed by: EMERGENCY MEDICINE

## 2024-10-23 PROCEDURE — 84484 ASSAY OF TROPONIN QUANT: CPT | Performed by: STUDENT IN AN ORGANIZED HEALTH CARE EDUCATION/TRAINING PROGRAM

## 2024-10-23 PROCEDURE — 99284 EMERGENCY DEPT VISIT MOD MDM: CPT | Performed by: STUDENT IN AN ORGANIZED HEALTH CARE EDUCATION/TRAINING PROGRAM

## 2024-10-23 PROCEDURE — 85025 COMPLETE CBC W/AUTO DIFF WBC: CPT | Performed by: STUDENT IN AN ORGANIZED HEALTH CARE EDUCATION/TRAINING PROGRAM

## 2024-10-23 PROCEDURE — 84484 ASSAY OF TROPONIN QUANT: CPT | Performed by: EMERGENCY MEDICINE

## 2024-10-23 PROCEDURE — 85610 PROTHROMBIN TIME: CPT | Performed by: STUDENT IN AN ORGANIZED HEALTH CARE EDUCATION/TRAINING PROGRAM

## 2024-10-23 PROCEDURE — 84075 ASSAY ALKALINE PHOSPHATASE: CPT | Performed by: EMERGENCY MEDICINE

## 2024-10-23 PROCEDURE — 83880 ASSAY OF NATRIURETIC PEPTIDE: CPT | Performed by: STUDENT IN AN ORGANIZED HEALTH CARE EDUCATION/TRAINING PROGRAM

## 2024-10-23 ASSESSMENT — LIFESTYLE VARIABLES
TOTAL SCORE: 0
EVER HAD A DRINK FIRST THING IN THE MORNING TO STEADY YOUR NERVES TO GET RID OF A HANGOVER: NO
HAVE PEOPLE ANNOYED YOU BY CRITICIZING YOUR DRINKING: NO
HAVE YOU EVER FELT YOU SHOULD CUT DOWN ON YOUR DRINKING: NO
EVER FELT BAD OR GUILTY ABOUT YOUR DRINKING: NO

## 2024-10-23 ASSESSMENT — PAIN SCALES - GENERAL
PAINLEVEL_OUTOF10: 7
PAINLEVEL_OUTOF10: 8
PAINLEVEL_OUTOF10: 4
PAINLEVEL_OUTOF10: 8

## 2024-10-23 ASSESSMENT — COLUMBIA-SUICIDE SEVERITY RATING SCALE - C-SSRS
6. HAVE YOU EVER DONE ANYTHING, STARTED TO DO ANYTHING, OR PREPARED TO DO ANYTHING TO END YOUR LIFE?: NO
2. HAVE YOU ACTUALLY HAD ANY THOUGHTS OF KILLING YOURSELF?: NO
1. IN THE PAST MONTH, HAVE YOU WISHED YOU WERE DEAD OR WISHED YOU COULD GO TO SLEEP AND NOT WAKE UP?: NO

## 2024-10-23 ASSESSMENT — PAIN DESCRIPTION - LOCATION: LOCATION: CHEST

## 2024-10-23 ASSESSMENT — PAIN - FUNCTIONAL ASSESSMENT
PAIN_FUNCTIONAL_ASSESSMENT: 0-10
PAIN_FUNCTIONAL_ASSESSMENT: 0-10

## 2024-10-23 ASSESSMENT — PAIN DESCRIPTION - PAIN TYPE: TYPE: ACUTE PAIN

## 2024-10-23 NOTE — ED PROVIDER NOTES
"HPI   Chief Complaint   Patient presents with    Chest Pain       Patient is a 44-year-old female with history of hypertension, hyperlipidemia presenting [facility] ED for right-sided chest pain.  Patient reports that her right-sided chest pain began around late last night.  Patient reports it went up to \"8/10\", that continued overnight, intermittent in nature.  Patient reports that is currently 4/5 out of 10.  Patient denies any lightheadedness, dizziness, sweating, vision change during her chest pain.  Patient reports that she is comfortable right now.  Patient does also reports some recent diarrhea from possible food poisoning/viral illness.  Patient denies any shortness of breath, nausea, vomiting, abdominal pain, fever, chills, dizziness, headache, or weakness.              Patient History   Past Medical History:   Diagnosis Date    Acute upper respiratory infection, unspecified 10/21/2016    Acute URI    Acute upper respiratory infection, unspecified 06/29/2020    Acute upper respiratory infection    COVID-19 04/10/2020    COVID-19    Cutaneous abscess of abdominal wall 11/29/2018    Abscess of skin of abdomen    Cutaneous abscess of limb, unspecified 11/29/2018    Abscess of lower leg    Cutaneous abscess of limb, unspecified 09/05/2018    Abscess, axilla    Cutaneous abscess of limb, unspecified 09/01/2018    Abscess of upper extremity    Encounter for follow-up examination after completed treatment for conditions other than malignant neoplasm 02/09/2018    Hospital discharge follow-up    Encounter for general adult medical examination without abnormal findings 12/01/2016    Annual physical exam    Encounter for screening for lipoid disorders 09/23/2019    Screening for lipid disorders    Encounter for screening for other suspected endocrine disorder 09/23/2019    Screening for thyroid disorder    Influenza due to other identified influenza virus with other respiratory manifestations 01/22/2019    Influenza " A    Nausea 12/01/2016    Moderate nausea    Otalgia, left ear 05/03/2017    Left ear pain    Other conditions influencing health status 10/26/2017    Abdominal abscess    Other conditions influencing health status 09/09/2015    Dressing change/suture removal    Otitis media, unspecified, left ear 05/03/2017    Otitis media, left    Pain in left lower leg 06/08/2018    Pain of left calf    Personal history of diseases of the skin and subcutaneous tissue 08/10/2017    History of acne    Personal history of diseases of the skin and subcutaneous tissue 09/02/2015    History of sebaceous cyst    Personal history of diseases of the skin and subcutaneous tissue 11/29/2018    History of cellulitis    Personal history of Methicillin resistant Staphylococcus aureus infection 11/29/2018    History of MRSA infection    Personal history of other diseases of the digestive system 07/15/2016    History of rectal bleeding    Personal history of other diseases of the digestive system 03/07/2017    History of acute gastritis    Personal history of other diseases of the nervous system and sense organs 04/10/2020    History of acute otitis externa    Personal history of other diseases of the respiratory system 08/10/2017    History of acute bronchitis    Personal history of other drug therapy 10/30/2019    History of drug therapy    Personal history of other drug therapy 03/02/2021    History of drug therapy    Personal history of other infectious and parasitic diseases     History of staphylococcal infection    Personal history of other infectious and parasitic diseases 09/25/2018    History of staphylococcal infection    Personal history of other infectious and parasitic diseases 02/20/2018    History of candidiasis of vagina    Personal history of other specified conditions 09/25/2020    History of tachycardia    Personal history of other specified conditions 06/29/2020    History of persistent cough    Personal history of other  specified conditions 03/07/2017    History of epigastric pain    Somnolence 06/09/2021    Daytime somnolence     Past Surgical History:   Procedure Laterality Date    OTHER SURGICAL HISTORY  08/10/2017    Abdominal Surgery     Family History   Problem Relation Name Age of Onset    Arthritis Mother      Hypertension Mother      Migraines Mother      Thyroid disease Mother      Diabetes Father          mellitus    Migraines Paternal Grandmother       Social History     Tobacco Use    Smoking status: Never    Smokeless tobacco: Never   Vaping Use    Vaping status: Never Used   Substance Use Topics    Alcohol use: Yes     Comment: Social    Drug use: Never       Physical Exam   ED Triage Vitals   Temperature Heart Rate Respirations BP   10/23/24 0622 10/23/24 0622 10/23/24 0622 10/23/24 0622   36.1 °C (97 °F) (!) 103 20 (!) 174/113      Pulse Ox Temp Source Heart Rate Source Patient Position   10/23/24 0622 10/23/24 0622 10/23/24 0622 10/23/24 0802   97 % Temporal Monitor Sitting      BP Location FiO2 (%)     10/23/24 0802 --     Right arm        Physical Exam  Constitutional:       Appearance: Normal appearance. She is obese.   HENT:      Head: Normocephalic and atraumatic.      Nose: Nose normal.      Mouth/Throat:      Mouth: Mucous membranes are moist.      Pharynx: Oropharynx is clear.   Eyes:      Extraocular Movements: Extraocular movements intact.      Conjunctiva/sclera: Conjunctivae normal.      Pupils: Pupils are equal, round, and reactive to light.   Cardiovascular:      Rate and Rhythm: Normal rate and regular rhythm.      Pulses: Normal pulses.      Heart sounds: Normal heart sounds.   Pulmonary:      Effort: Pulmonary effort is normal.      Breath sounds: Normal breath sounds.   Abdominal:      General: Abdomen is flat. Bowel sounds are normal.      Palpations: Abdomen is soft.   Musculoskeletal:         General: Normal range of motion.      Cervical back: Normal range of motion and neck supple.   Skin:      General: Skin is warm and dry.      Capillary Refill: Capillary refill takes less than 2 seconds.   Neurological:      General: No focal deficit present.      Mental Status: She is alert and oriented to person, place, and time. Mental status is at baseline.   Psychiatric:         Mood and Affect: Mood normal.         Behavior: Behavior normal.           ED Course & MDM   ED Course as of 10/23/24 2015   Wed Oct 23, 2024   1033 Patient denies any chest pain at this time. [CR]   1034 Heart rate in 50s, PERC negative  [CR]      ED Course User Index  [CR] aCsa Johnson MD         Diagnoses as of 10/23/24 2015   Chest pain, unspecified type                 No data recorded     [place] Coma Scale Score: 15 (10/23/24 0623 : Lyndsey Leblanc RN)                           Medical Decision Making  Patient is a 44 y.o. female who presents to [facility] ED for Chest Pain. On initial ED evaluation, patient found to be in no acute distress. Per HPI, concern to evaluate and treat for ACS rule out.  Obtain cardiac labs and diagnostics.  Patient had no acute ischemic change on EKG.  Patient had negative delta troponin.  CBC showed no concerning leukocytosis or anemia.  CMP showed no concerning TRAM or electrolyte abnormality.  Patient has heart score of 3.  Low concern for PE at this time as well, as patient can be cleared by PERC criteria.  Patient vitally stable at this time.  Patient to follow-up with PCP.  Patient to be discharged home.  Diagnostic findings and treatment plan discussed with patient.  Patient amenable to plan.    Patient to follow up with PCP outpatient. Anticipatory guidance and return precautions provided.  Patient otherwise stable for discharge.          Procedure  Procedures     Roberto Carlos Ferguson MD  Resident  10/23/24 2018

## 2024-10-23 NOTE — DISCHARGE INSTRUCTIONS
Please follow-up with your PCP in the next week.  Please return close ED if develop any worsening symptoms including severe chest pain, persistent shortness of breath, weakness, or loss of function.

## 2024-10-24 LAB
ATRIAL RATE: 106 BPM
ATRIAL RATE: 94 BPM
P AXIS: 17 DEGREES
P AXIS: 26 DEGREES
P OFFSET: 189 MS
P OFFSET: 191 MS
P ONSET: 142 MS
P ONSET: 142 MS
PR INTERVAL: 144 MS
PR INTERVAL: 146 MS
Q ONSET: 214 MS
Q ONSET: 215 MS
QRS COUNT: 16 BEATS
QRS COUNT: 17 BEATS
QRS DURATION: 78 MS
QRS DURATION: 82 MS
QT INTERVAL: 342 MS
QT INTERVAL: 356 MS
QTC CALCULATION(BAZETT): 445 MS
QTC CALCULATION(BAZETT): 454 MS
QTC FREDERICIA: 413 MS
QTC FREDERICIA: 413 MS
R AXIS: 14 DEGREES
R AXIS: 18 DEGREES
T AXIS: -13 DEGREES
T AXIS: 2 DEGREES
T OFFSET: 386 MS
T OFFSET: 392 MS
VENTRICULAR RATE: 106 BPM
VENTRICULAR RATE: 94 BPM

## 2024-10-31 ENCOUNTER — APPOINTMENT (OUTPATIENT)
Dept: PRIMARY CARE | Facility: CLINIC | Age: 44
End: 2024-10-31
Payer: COMMERCIAL

## 2024-11-07 ENCOUNTER — APPOINTMENT (OUTPATIENT)
Dept: PRIMARY CARE | Facility: CLINIC | Age: 44
End: 2024-11-07
Payer: COMMERCIAL

## 2024-11-12 ENCOUNTER — APPOINTMENT (OUTPATIENT)
Dept: PRIMARY CARE | Facility: CLINIC | Age: 44
End: 2024-11-12
Payer: COMMERCIAL

## 2024-11-14 ENCOUNTER — APPOINTMENT (OUTPATIENT)
Dept: PRIMARY CARE | Facility: CLINIC | Age: 44
End: 2024-11-14
Payer: COMMERCIAL

## 2024-12-24 DIAGNOSIS — I10 ESSENTIAL (PRIMARY) HYPERTENSION: ICD-10-CM

## 2024-12-26 RX ORDER — METOPROLOL SUCCINATE 50 MG/1
TABLET, EXTENDED RELEASE ORAL
Qty: 90 TABLET | Refills: 1 | Status: SHIPPED | OUTPATIENT
Start: 2024-12-26

## 2025-02-03 DIAGNOSIS — E03.9 HYPOTHYROIDISM, UNSPECIFIED TYPE: ICD-10-CM

## 2025-02-03 RX ORDER — LEVOTHYROXINE SODIUM 75 UG/1
75 TABLET ORAL DAILY
Qty: 90 TABLET | Refills: 0 | Status: SHIPPED | OUTPATIENT
Start: 2025-02-03

## 2025-02-14 PROBLEM — R05.3 PERSISTENT COUGH: Status: RESOLVED | Noted: 2024-07-01 | Resolved: 2025-02-14

## 2025-02-14 PROBLEM — E05.90 HYPERTHYROIDISM: Status: RESOLVED | Noted: 2023-02-25 | Resolved: 2025-02-14

## 2025-02-14 PROBLEM — E66.9 OBESITY: Status: RESOLVED | Noted: 2023-02-25 | Resolved: 2025-02-14

## 2025-02-20 ENCOUNTER — APPOINTMENT (OUTPATIENT)
Dept: PRIMARY CARE | Facility: CLINIC | Age: 45
End: 2025-02-20
Payer: COMMERCIAL

## 2025-03-24 ENCOUNTER — APPOINTMENT (OUTPATIENT)
Dept: PRIMARY CARE | Facility: CLINIC | Age: 45
End: 2025-03-24
Payer: COMMERCIAL

## 2025-04-18 ENCOUNTER — APPOINTMENT (OUTPATIENT)
Dept: PRIMARY CARE | Facility: CLINIC | Age: 45
End: 2025-04-18
Payer: COMMERCIAL

## 2025-04-18 VITALS
TEMPERATURE: 97.6 F | HEART RATE: 90 BPM | DIASTOLIC BLOOD PRESSURE: 98 MMHG | WEIGHT: 293 LBS | BODY MASS INDEX: 51.91 KG/M2 | HEIGHT: 63 IN | SYSTOLIC BLOOD PRESSURE: 150 MMHG

## 2025-04-18 DIAGNOSIS — I10 ESSENTIAL (PRIMARY) HYPERTENSION: ICD-10-CM

## 2025-04-18 DIAGNOSIS — E53.8 VITAMIN B12 DEFICIENCY: ICD-10-CM

## 2025-04-18 DIAGNOSIS — I10 HYPERTENSION, UNSPECIFIED TYPE: Primary | ICD-10-CM

## 2025-04-18 DIAGNOSIS — Z12.31 SCREENING MAMMOGRAM FOR BREAST CANCER: ICD-10-CM

## 2025-04-18 DIAGNOSIS — E03.9 HYPOTHYROIDISM, UNSPECIFIED TYPE: ICD-10-CM

## 2025-04-18 DIAGNOSIS — E55.9 VITAMIN D DEFICIENCY: ICD-10-CM

## 2025-04-18 DIAGNOSIS — G43.009 MIGRAINE WITHOUT AURA AND WITHOUT STATUS MIGRAINOSUS, NOT INTRACTABLE: ICD-10-CM

## 2025-04-18 PROCEDURE — 99214 OFFICE O/P EST MOD 30 MIN: CPT | Performed by: FAMILY MEDICINE

## 2025-04-18 PROCEDURE — 3080F DIAST BP >= 90 MM HG: CPT | Performed by: FAMILY MEDICINE

## 2025-04-18 PROCEDURE — 3077F SYST BP >= 140 MM HG: CPT | Performed by: FAMILY MEDICINE

## 2025-04-18 PROCEDURE — 3008F BODY MASS INDEX DOCD: CPT | Performed by: FAMILY MEDICINE

## 2025-04-18 PROCEDURE — 1036F TOBACCO NON-USER: CPT | Performed by: FAMILY MEDICINE

## 2025-04-18 RX ORDER — LISINOPRIL AND HYDROCHLOROTHIAZIDE 12.5; 2 MG/1; MG/1
1 TABLET ORAL DAILY
Qty: 90 TABLET | Refills: 1 | Status: SHIPPED | OUTPATIENT
Start: 2025-04-18 | End: 2025-10-15

## 2025-04-18 RX ORDER — AMLODIPINE BESYLATE 5 MG/1
5 TABLET ORAL DAILY
Qty: 90 TABLET | Refills: 1 | Status: SHIPPED | OUTPATIENT
Start: 2025-04-18

## 2025-04-18 RX ORDER — METOPROLOL SUCCINATE 50 MG/1
50 TABLET, EXTENDED RELEASE ORAL DAILY
Qty: 90 TABLET | Refills: 1 | Status: SHIPPED | OUTPATIENT
Start: 2025-04-18

## 2025-04-18 ASSESSMENT — PATIENT HEALTH QUESTIONNAIRE - PHQ9
2. FEELING DOWN, DEPRESSED OR HOPELESS: NOT AT ALL
SUM OF ALL RESPONSES TO PHQ9 QUESTIONS 1 AND 2: 0
1. LITTLE INTEREST OR PLEASURE IN DOING THINGS: NOT AT ALL

## 2025-04-18 NOTE — PROGRESS NOTES
"    BP (!) 150/98   Pulse 90   Temp 36.4 °C (97.6 °F)   Ht 1.6 m (5' 3\")   Wt (!) 153 kg (337 lb 3.2 oz)   BMI 59.73 kg/m²     Medical History[1]    Problem List[2]    Current Medications[3]    CC/HPI/ASSESSMENT/PLAN    CC establish care refill medicines    HPI patient 44-year-old female here to establish care asking for refills and blood work orders.  Patient suffers from hypertension hypothyroidism migraine headaches vitamin deficiency.  Blood pressure is quite elevated today.  Blood pressure last checked 6 months ago in the ER was also very elevated.  Medications reviewed.  She does suffer from severe migraines.  She takes Nurtec every other day with reasonable control of the migraines.  She has tried other medicines with little relief.  Denies fever chills chest pain short of breath abdominal pain blood in urine or stool.  Patient requesting mammography.  ROS negative some noted above.  Past medical social history    Exam calm vitals reviewed blood pressure elevated.  Recheck by myself 150/104.  Eyes no jaundice mouth moist neck supple no LAD goiter carotid bruit.  Lungs CTA good AE.  CV RRR no MRG.  Abdomen obese nontender EXT no edema skin no rash neuro alert oriented no focal neurologic deficit noted no cognitive deficits noted.  Psych calm pleasant female no anxiety or depression    A/P 1.  Hypertension uncontrolled.  Increase lisinopril 20 mg daily continue amlodipine 5 mg with metoprolol 50 mg.  #2 hypothyroidism.  Blood work is ordered.  #3 migraine headaches, Nurtec refilled.  [place] paperwork to be completed.  #4 vitamin D deficiency.  Blood work ordered.  Medicines refilled.  Follow-up 6 to 8 weeks.  Mammogram is ordered.  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  Follow-up 6 to 8 weeks    There are no diagnoses linked to this encounter.          [1]   Past Medical History:  Diagnosis Date    Acute upper respiratory infection, unspecified 10/21/2016    Acute URI    Acute upper " respiratory infection, unspecified 06/29/2020    Acute upper respiratory infection    COVID-19 04/10/2020    COVID-19    Cutaneous abscess of abdominal wall 11/29/2018    Abscess of skin of abdomen    Cutaneous abscess of limb, unspecified 11/29/2018    Abscess of lower leg    Cutaneous abscess of limb, unspecified 09/05/2018    Abscess, axilla    Cutaneous abscess of limb, unspecified 09/01/2018    Abscess of upper extremity    Encounter for follow-up examination after completed treatment for conditions other than malignant neoplasm 02/09/2018    Hospital discharge follow-up    Encounter for general adult medical examination without abnormal findings 12/01/2016    Annual physical exam    Encounter for screening for lipoid disorders 09/23/2019    Screening for lipid disorders    Encounter for screening for other suspected endocrine disorder 09/23/2019    Screening for thyroid disorder    Hyperthyroidism 02/25/2023    Influenza due to other identified influenza virus with other respiratory manifestations 01/22/2019    Influenza A    Nausea 12/01/2016    Moderate nausea    Otalgia, left ear 05/03/2017    Left ear pain    Other conditions influencing health status 10/26/2017    Abdominal abscess    Other conditions influencing health status 09/09/2015    Dressing change/suture removal    Otitis media, unspecified, left ear 05/03/2017    Otitis media, left    Pain in left lower leg 06/08/2018    Pain of left calf    Personal history of diseases of the skin and subcutaneous tissue 08/10/2017    History of acne    Personal history of diseases of the skin and subcutaneous tissue 09/02/2015    History of sebaceous cyst    Personal history of diseases of the skin and subcutaneous tissue 11/29/2018    History of cellulitis    Personal history of Methicillin resistant Staphylococcus aureus infection 11/29/2018    History of MRSA infection    Personal history of other diseases of the digestive system 07/15/2016    History of  rectal bleeding    Personal history of other diseases of the digestive system 03/07/2017    History of acute gastritis    Personal history of other diseases of the nervous system and sense organs 04/10/2020    History of acute otitis externa    Personal history of other diseases of the respiratory system 08/10/2017    History of acute bronchitis    Personal history of other drug therapy 10/30/2019    History of drug therapy    Personal history of other drug therapy 03/02/2021    History of drug therapy    Personal history of other infectious and parasitic diseases     History of staphylococcal infection    Personal history of other infectious and parasitic diseases 09/25/2018    History of staphylococcal infection    Personal history of other infectious and parasitic diseases 02/20/2018    History of candidiasis of vagina    Personal history of other specified conditions 09/25/2020    History of tachycardia    Personal history of other specified conditions 06/29/2020    History of persistent cough    Personal history of other specified conditions 03/07/2017    History of epigastric pain    Somnolence 06/09/2021    Daytime somnolence    Tinea 01/10/2012   [2]   Patient Active Problem List  Diagnosis    Anemia, deficiency    Arthritis of left knee    Chondromalacia of left patella    Chronic headaches    Graves disease    Heart murmur    Hidradenitis suppurativa    Left knee pain    Medial meniscus tear    HTN (hypertension)    Migraines    Morbid obesity (Multi)    Osteoarthritis of knee, unspecified    Patellofemoral pain syndrome of left knee    Weight gain    Acne    Acute gastritis    Acute bronchitis    Candidiasis of vagina    Epigastric pain    Patellofemoral pain syndrome    Rectal hemorrhage    Sebaceous cyst    Tachycardia   [3]   Current Outpatient Medications   Medication Sig Dispense Refill    amLODIPine (Norvasc) 5 mg tablet TAKE 1 TABLET BY MOUTH EVERY DAY 90 tablet 3    cholecalciferol (Vitamin D-3)  25 MCG (1000 UT) capsule Take 1 capsule (25 mcg) by mouth once daily.      ferrous sulfate (IRON ORAL) Take 1 tablet by mouth once daily.      levothyroxine (Synthroid, Levoxyl) 75 mcg tablet TAKE 1 TABLET BY MOUTH ONCE DAILY. 90 tablet 0    lisinopriL-hydrochlorothiazide 10-12.5 mg tablet TAKE 1 TABLET BY MOUTH EVERY DAY 90 tablet 3    metoprolol succinate XL (Toprol-XL) 50 mg 24 hr tablet TAKE 1 TABLET BY MOUTH EVERY DAY 90 tablet 1    rimegepant (Nurtec ODT) 75 mg tablet,disintegrating Take 1 tablet (75 mg) by mouth every other day. 48 tablet 3    vitamin B complex tablet Take 1 tablet by mouth once daily.      terbinafine (LamISIL) 250 mg tablet Take 1 tablet (250 mg) by mouth early in the morning.. (Patient not taking: Reported on 4/18/2025)       No current facility-administered medications for this visit.

## 2025-05-02 DIAGNOSIS — I10 ESSENTIAL (PRIMARY) HYPERTENSION: ICD-10-CM

## 2025-05-02 RX ORDER — LISINOPRIL AND HYDROCHLOROTHIAZIDE 10; 12.5 MG/1; MG/1
1 TABLET ORAL DAILY
Qty: 90 TABLET | Refills: 3 | OUTPATIENT
Start: 2025-05-02

## 2025-05-13 LAB
25(OH)D3+25(OH)D2 SERPL-MCNC: 36 NG/ML (ref 30–100)
ALBUMIN SERPL-MCNC: 4.4 G/DL (ref 3.6–5.1)
ALP SERPL-CCNC: 74 U/L (ref 31–125)
ALT SERPL-CCNC: 25 U/L (ref 6–29)
ANION GAP SERPL CALCULATED.4IONS-SCNC: 10 MMOL/L (CALC) (ref 7–17)
AST SERPL-CCNC: 18 U/L (ref 10–30)
BILIRUB SERPL-MCNC: 0.3 MG/DL (ref 0.2–1.2)
BUN SERPL-MCNC: 15 MG/DL (ref 7–25)
CALCIUM SERPL-MCNC: 9.4 MG/DL (ref 8.6–10.2)
CHLORIDE SERPL-SCNC: 101 MMOL/L (ref 98–110)
CHOLEST SERPL-MCNC: 209 MG/DL
CHOLEST/HDLC SERPL: 4.3 (CALC)
CO2 SERPL-SCNC: 26 MMOL/L (ref 20–32)
CREAT SERPL-MCNC: 0.73 MG/DL (ref 0.5–0.99)
EGFRCR SERPLBLD CKD-EPI 2021: 105 ML/MIN/1.73M2
EST. AVERAGE GLUCOSE BLD GHB EST-MCNC: 123 MG/DL
EST. AVERAGE GLUCOSE BLD GHB EST-SCNC: 6.8 MMOL/L
GLUCOSE SERPL-MCNC: 100 MG/DL (ref 65–99)
HBA1C MFR BLD: 5.9 %
HDLC SERPL-MCNC: 49 MG/DL
LDLC SERPL CALC-MCNC: 128 MG/DL (CALC)
NONHDLC SERPL-MCNC: 160 MG/DL (CALC)
POTASSIUM SERPL-SCNC: 5 MMOL/L (ref 3.5–5.3)
PROT SERPL-MCNC: 7.9 G/DL (ref 6.1–8.1)
SODIUM SERPL-SCNC: 137 MMOL/L (ref 135–146)
T4 FREE SERPL-MCNC: 1 NG/DL (ref 0.8–1.8)
TRIGL SERPL-MCNC: 188 MG/DL
TSH SERPL-ACNC: 10.01 MIU/L
VIT B12 SERPL-MCNC: 912 PG/ML (ref 200–1100)

## 2025-05-16 ENCOUNTER — TELEPHONE (OUTPATIENT)
Dept: PRIMARY CARE | Facility: CLINIC | Age: 45
End: 2025-05-16
Payer: COMMERCIAL

## 2025-05-16 NOTE — TELEPHONE ENCOUNTER
----- Message from Cong Valente sent at 5/15/2025  1:52 PM EDT -----  Cholesterol borderline.  Liver kidney sugar vitamin D vitamin B12 levels look good.  Thyroid level borderline low.  Recommend taking an additional 75 mcg dose tablet once a week so that patient is   taking 8 tablets every 7 days  ----- Message -----  From: [employer] Feed Results In  Sent: 5/13/2025   5:37 AM EDT  To: Cong Valente MD

## 2025-05-16 NOTE — TELEPHONE ENCOUNTER
----- Message from Cong Valente sent at 5/15/2025  1:52 PM EDT -----  Cholesterol borderline.  Liver kidney sugar vitamin D vitamin B12 levels look good.  Thyroid level borderline low.  Recommend taking an additional 75 mcg dose tablet once a week so that patient is   taking 8 tablets every 7 days  ----- Message -----  From: [employer] Results In  Sent: 5/13/2025   5:37 AM EDT  To: Cong Valente MD

## 2025-06-12 ENCOUNTER — APPOINTMENT (OUTPATIENT)
Dept: PRIMARY CARE | Facility: CLINIC | Age: 45
End: 2025-06-12
Payer: COMMERCIAL

## 2025-06-30 ENCOUNTER — APPOINTMENT (OUTPATIENT)
Dept: PRIMARY CARE | Facility: CLINIC | Age: 45
End: 2025-06-30
Payer: COMMERCIAL

## 2025-07-02 ENCOUNTER — APPOINTMENT (OUTPATIENT)
Dept: PRIMARY CARE | Facility: CLINIC | Age: 45
End: 2025-07-02
Payer: COMMERCIAL

## 2025-07-02 VITALS
HEART RATE: 102 BPM | TEMPERATURE: 97.7 F | HEIGHT: 63 IN | WEIGHT: 293 LBS | DIASTOLIC BLOOD PRESSURE: 102 MMHG | BODY MASS INDEX: 51.91 KG/M2 | SYSTOLIC BLOOD PRESSURE: 140 MMHG

## 2025-07-02 DIAGNOSIS — M94.0 COSTOCHONDRITIS: ICD-10-CM

## 2025-07-02 DIAGNOSIS — E66.01 MORBID OBESITY (MULTI): ICD-10-CM

## 2025-07-02 DIAGNOSIS — E03.9 HYPOTHYROIDISM, UNSPECIFIED TYPE: ICD-10-CM

## 2025-07-02 DIAGNOSIS — I10 PRIMARY HYPERTENSION: Primary | ICD-10-CM

## 2025-07-02 PROCEDURE — 3080F DIAST BP >= 90 MM HG: CPT | Performed by: FAMILY MEDICINE

## 2025-07-02 PROCEDURE — 99215 OFFICE O/P EST HI 40 MIN: CPT | Performed by: FAMILY MEDICINE

## 2025-07-02 PROCEDURE — 3008F BODY MASS INDEX DOCD: CPT | Performed by: FAMILY MEDICINE

## 2025-07-02 PROCEDURE — 3077F SYST BP >= 140 MM HG: CPT | Performed by: FAMILY MEDICINE

## 2025-07-02 RX ORDER — LEVOTHYROXINE SODIUM 88 UG/1
80 TABLET ORAL DAILY
Qty: 90 TABLET | Refills: 1 | Status: SHIPPED | OUTPATIENT
Start: 2025-07-02 | End: 2025-12-29

## 2025-07-02 NOTE — PROGRESS NOTES
"    BP (!) 140/102   Pulse 102   Temp 36.5 °C (97.7 °F)   Ht 1.6 m (5' 3\")   Wt (!) 154 kg (340 lb 3.2 oz)   BMI 60.26 kg/m²     Medical History[1]    Problem List[2]    Current Medications[3]    CC/HPI/ASSESSMENT/PLAN    CC follow-up medication    HPI patient 45-year-old female history of hypertension hypothyroidism morbid obesity, recent blood work performed reviewed.  Thyroid level slightly low we increased her thyroid to 75 mcg 8 days a week.  I have recommended increasing to 88 mcg daily dose which will be ordered.  Liver kidney sugar levels are good all results were reviewed.  Her blood pressure remains mildly elevated today.  I have asked her to start monitoring her blood pressure at home.  Will continue with current blood pressure medications.  Her BMI is 60.  Patient notes she consumes 1800 mario daily.  We discussed cutting to a 1400-calorie daily diet to allow for slow steady weight loss.  I advised her that I half a pound to a pound a week is excellent weight loss.  We discussed decreasing starch intakes.  Patient notes she was in the hospital for the ER visit 8 months ago with chest pain.  Extensive workup was negative.  Patient notes she intermittently gets the right-sided chest pain that can last minutes to an hour.  It is not exertional.  ROS negative except noted above    Exam calm vitals reviewed neck supple lungs CTA CV RRR palpation of her anterior chest wall reveals significant pain with light palpation over the right costochondral joints.  Abdomen obese Ext no edema skin no rash    A/P 1.  Hypertension borderline controlled.  Continue current medications.  She will monitor blood pressure at home.  Dietary changes discussed.  #2 morbid obesity dietary changes discussed as noted above.  Follow-up 2 to 3 months.  #3 hypothyroidism will order levothyroxine 88 mcg daily dose.  Will recheck blood work in 3 months.  #4 costochondritis upper chest wall.  We discussed this in length.  Patient will " follow-up 3 months.  I spent in excess of 50 minutes with patient more than 50% of that time spent in counseling discussion with regards to her medical ailments as well as weight loss counseling.  Follow-up 3 months    There are no diagnoses linked to this encounter.          [1]   Past Medical History:  Diagnosis Date    Acute upper respiratory infection, unspecified 10/21/2016    Acute URI    Acute upper respiratory infection, unspecified 06/29/2020    Acute upper respiratory infection    COVID-19 04/10/2020    COVID-19    Cutaneous abscess of abdominal wall 11/29/2018    Abscess of skin of abdomen    Cutaneous abscess of limb, unspecified 11/29/2018    Abscess of lower leg    Cutaneous abscess of limb, unspecified 09/05/2018    Abscess, axilla    Cutaneous abscess of limb, unspecified 09/01/2018    Abscess of upper extremity    Encounter for follow-up examination after completed treatment for conditions other than malignant neoplasm 02/09/2018    Hospital discharge follow-up    Encounter for general adult medical examination without abnormal findings 12/01/2016    Annual physical exam    Encounter for screening for lipoid disorders 09/23/2019    Screening for lipid disorders    Encounter for screening for other suspected endocrine disorder 09/23/2019    Screening for thyroid disorder    Hyperthyroidism 02/25/2023    Influenza due to other identified influenza virus with other respiratory manifestations 01/22/2019    Influenza A    Nausea 12/01/2016    Moderate nausea    Otalgia, left ear 05/03/2017    Left ear pain    Other conditions influencing health status 10/26/2017    Abdominal abscess    Other conditions influencing health status 09/09/2015    Dressing change/suture removal    Otitis media, unspecified, left ear 05/03/2017    Otitis media, left    Pain in left lower leg 06/08/2018    Pain of left calf    Personal history of diseases of the skin and subcutaneous tissue 08/10/2017    History of acne     Personal history of diseases of the skin and subcutaneous tissue 09/02/2015    History of sebaceous cyst    Personal history of diseases of the skin and subcutaneous tissue 11/29/2018    History of cellulitis    Personal history of Methicillin resistant Staphylococcus aureus infection 11/29/2018    History of MRSA infection    Personal history of other diseases of the digestive system 07/15/2016    History of rectal bleeding    Personal history of other diseases of the digestive system 03/07/2017    History of acute gastritis    Personal history of other diseases of the nervous system and sense organs 04/10/2020    History of acute otitis externa    Personal history of other diseases of the respiratory system 08/10/2017    History of acute bronchitis    Personal history of other drug therapy 10/30/2019    History of drug therapy    Personal history of other drug therapy 03/02/2021    History of drug therapy    Personal history of other infectious and parasitic diseases     History of staphylococcal infection    Personal history of other infectious and parasitic diseases 09/25/2018    History of staphylococcal infection    Personal history of other infectious and parasitic diseases 02/20/2018    History of candidiasis of vagina    Personal history of other specified conditions 09/25/2020    History of tachycardia    Personal history of other specified conditions 06/29/2020    History of persistent cough    Personal history of other specified conditions 03/07/2017    History of epigastric pain    Somnolence 06/09/2021    Daytime somnolence    Tinea 01/10/2012   [2]   Patient Active Problem List  Diagnosis    Anemia, deficiency    Arthritis of left knee    Chondromalacia of left patella    Chronic headaches    Graves disease    Heart murmur    Hidradenitis suppurativa    Left knee pain    Medial meniscus tear    HTN (hypertension)    Migraines    Morbid obesity (Multi)    Osteoarthritis of knee, unspecified     Patellofemoral pain syndrome of left knee    Weight gain    Acne    Acute gastritis    Acute bronchitis    Candidiasis of vagina    Epigastric pain    Patellofemoral pain syndrome    Rectal hemorrhage    Sebaceous cyst    Tachycardia    Hypothyroidism    Vitamin D deficiency   [3]   Current Outpatient Medications   Medication Sig Dispense Refill    amLODIPine (Norvasc) 5 mg tablet Take 1 tablet (5 mg) by mouth once daily. 90 tablet 1    cholecalciferol (Vitamin D-3) 25 MCG (1000 UT) capsule Take 1 capsule (25 mcg) by mouth once daily.      ferrous sulfate (IRON ORAL) Take 1 tablet by mouth once daily.      levothyroxine (Synthroid, Levoxyl) 75 mcg tablet TAKE 1 TABLET BY MOUTH EVERY DAY 90 tablet 0    lisinopriL-hydrochlorothiazide 20-12.5 mg tablet Take 1 tablet by mouth once daily. 90 tablet 1    metoprolol succinate XL (Toprol-XL) 50 mg 24 hr tablet Take 1 tablet (50 mg) by mouth once daily. Do not crush or chew. 90 tablet 1    rimegepant (Nurtec ODT) 75 mg tablet,disintegrating Dissolve 1 tablet (75 mg) in the mouth every other day. 48 tablet 3    vitamin B complex tablet Take 1 tablet by mouth once daily.       No current facility-administered medications for this visit.

## 2025-07-07 ENCOUNTER — APPOINTMENT (OUTPATIENT)
Dept: RADIOLOGY | Facility: HOSPITAL | Age: 45
End: 2025-07-07
Payer: COMMERCIAL

## 2025-08-18 ENCOUNTER — APPOINTMENT (OUTPATIENT)
Dept: RADIOLOGY | Facility: HOSPITAL | Age: 45
End: 2025-08-18
Payer: COMMERCIAL

## 2025-10-02 ENCOUNTER — APPOINTMENT (OUTPATIENT)
Dept: PRIMARY CARE | Facility: CLINIC | Age: 45
End: 2025-10-02
Payer: COMMERCIAL

## 2025-10-03 ENCOUNTER — APPOINTMENT (OUTPATIENT)
Dept: RADIOLOGY | Facility: HOSPITAL | Age: 45
End: 2025-10-03
Payer: COMMERCIAL